# Patient Record
Sex: MALE
[De-identification: names, ages, dates, MRNs, and addresses within clinical notes are randomized per-mention and may not be internally consistent; named-entity substitution may affect disease eponyms.]

---

## 2023-08-11 ENCOUNTER — APPOINTMENT (OUTPATIENT)
Dept: ORTHOPEDIC SURGERY | Facility: CLINIC | Age: 28
End: 2023-08-11
Payer: COMMERCIAL

## 2023-08-11 DIAGNOSIS — S83.281A OTHER TEAR OF LATERAL MENISCUS, CURRENT INJURY, RIGHT KNEE, INITIAL ENCOUNTER: ICD-10-CM

## 2023-08-11 PROBLEM — Z00.00 ENCOUNTER FOR PREVENTIVE HEALTH EXAMINATION: Status: ACTIVE | Noted: 2023-08-11

## 2023-08-11 PROCEDURE — 99203 OFFICE O/P NEW LOW 30 MIN: CPT

## 2023-08-11 RX ORDER — NAPROXEN 500 MG/1
500 TABLET ORAL
Qty: 60 | Refills: 1 | Status: ACTIVE | COMMUNITY
Start: 2023-08-11 | End: 1900-01-01

## 2023-08-11 NOTE — DISCUSSION/SUMMARY
[de-identified] : Pt has injury right knee. At this time, patient was advised come out of the knee immobilizer.  He is able to start moving his knee to prevent it from being stiff.  Patient does not need to use crutches but can use them if desired for the next week.  Patient was encouraged to ice for the next few days and transition to heat.  I am sending him a prescription for naproxen 500 mg to take as needed twice a day.  Pt was educated about risks and benefits of anti-inflammatories.  Anti-inflammatories can irritate the stomach lining, so if there are any symptoms of acid reflux or heartburn the patient was instructed to stop taking the medication. To help prevent this, it is best to take with a meal. They cannot be taken if the pt is on blood thinners and if the patient is at risk of high blood pressure, blood pressure should be monitored daily while taking the medication.  Patient will rest for the next few weeks, then follow-up in approximately 2 to 3 weeks, at that time physical therapy may be initiated.  Patient is agreeable to above plan all questions were answered today

## 2023-08-11 NOTE — HISTORY OF PRESENT ILLNESS
[de-identified] : 28-year-old male presents with right knee injury.  On 8/6/2023, patient was wakeboarding, and hit his knee hyperextended and he landed on it.  Patient went to his primary care provider, where an MRI was ordered and completed.  Patient has been in a knee immobilizer nonweightbearing with crutches.  Patient denies any past injuries or surgeries to the knee.  Patient states the pain is okay until he is bending his knee.  Patient has been taking over-the-counter anti-inflammatories.

## 2023-08-11 NOTE — DATA REVIEWED
[FreeTextEntry1] : MRI results viewed in office.  Results reveal subchondral fracture of the medial tibial plateau, quadriceps tendinopathy, lateral meniscal tear, sprain of the ACL, patella capo with recent patellar subluxation

## 2023-08-29 ENCOUNTER — APPOINTMENT (OUTPATIENT)
Dept: ORTHOPEDIC SURGERY | Facility: CLINIC | Age: 28
End: 2023-08-29